# Patient Record
Sex: FEMALE | Race: BLACK OR AFRICAN AMERICAN | Employment: FULL TIME | URBAN - METROPOLITAN AREA
[De-identification: names, ages, dates, MRNs, and addresses within clinical notes are randomized per-mention and may not be internally consistent; named-entity substitution may affect disease eponyms.]

---

## 2020-03-29 ENCOUNTER — OFFICE VISIT (OUTPATIENT)
Dept: URGENT CARE | Facility: URGENT CARE | Age: 35
End: 2020-03-29

## 2020-03-29 ENCOUNTER — RESULTS ONLY (OUTPATIENT)
Dept: LAB | Age: 35
End: 2020-03-29

## 2020-03-29 DIAGNOSIS — J11.1 INFLUENZA-LIKE ILLNESS: Primary | ICD-10-CM

## 2020-03-29 PROCEDURE — 99202 OFFICE O/P NEW SF 15 MIN: CPT | Performed by: FAMILY MEDICINE

## 2020-03-29 NOTE — PATIENT INSTRUCTIONS
[facility] Employee [facility] team will receive your Covid 19 test results in the next 1-4 days.  Once your results are received, Employee Health will call you with your test result and discuss your Return to Work timeline and criteria.

## 2020-03-29 NOTE — PROGRESS NOTES
SUBJECTIVE:  This 35 year old female presents for COVID-19 testing.  she reports cough.  Onset of symptoms was days.  Exposure history: no    OBJECTIVE:  Visual assessment shows:  GENERAL APPEARANCE is healthy without evident apparent respiratory distress  BREATHING: the patient is breathing comfortably and is speaking full sentences    ASSESSMENT/PLAN:    ICD-10-CM    1. Influenza-like illness  R69          Dr. Chris Sandra

## 2020-03-30 LAB
SARS-COV-2 RNA SPEC QL NAA+PROBE: NORMAL
SPECIMEN SOURCE: NORMAL

## 2020-03-31 LAB
COVID-19 VIRUS PCR TO MAYO - RESULT: ABNORMAL
SPECIMEN SOURCE: ABNORMAL